# Patient Record
Sex: MALE | Race: WHITE | NOT HISPANIC OR LATINO | Employment: UNEMPLOYED | URBAN - METROPOLITAN AREA
[De-identification: names, ages, dates, MRNs, and addresses within clinical notes are randomized per-mention and may not be internally consistent; named-entity substitution may affect disease eponyms.]

---

## 2017-10-13 ENCOUNTER — HOSPITAL ENCOUNTER (EMERGENCY)
Facility: HOSPITAL | Age: 62
End: 2017-10-14
Attending: EMERGENCY MEDICINE
Payer: MEDICARE

## 2017-10-13 ENCOUNTER — ALLSCRIPTS OFFICE VISIT (OUTPATIENT)
Dept: OTHER | Facility: OTHER | Age: 62
End: 2017-10-13

## 2017-10-13 VITALS
HEART RATE: 80 BPM | TEMPERATURE: 98.3 F | SYSTOLIC BLOOD PRESSURE: 135 MMHG | HEIGHT: 70 IN | RESPIRATION RATE: 18 BRPM | BODY MASS INDEX: 28.2 KG/M2 | DIASTOLIC BLOOD PRESSURE: 90 MMHG | WEIGHT: 197 LBS | OXYGEN SATURATION: 99 %

## 2017-10-13 DIAGNOSIS — F32.A DEPRESSION: Primary | ICD-10-CM

## 2017-10-13 LAB
ALBUMIN SERPL BCP-MCNC: 3.8 G/DL (ref 3.5–5)
ALP SERPL-CCNC: 97 U/L (ref 46–116)
ALT SERPL W P-5'-P-CCNC: 22 U/L (ref 12–78)
AMPHETAMINES SERPL QL SCN: NEGATIVE
ANION GAP SERPL CALCULATED.3IONS-SCNC: 11 MMOL/L (ref 4–13)
AST SERPL W P-5'-P-CCNC: 11 U/L (ref 5–45)
BARBITURATES UR QL: NEGATIVE
BASOPHILS # BLD AUTO: 0 THOUSANDS/ΜL (ref 0–0.1)
BASOPHILS NFR BLD AUTO: 0 % (ref 0–1)
BENZODIAZ UR QL: NEGATIVE
BILIRUB SERPL-MCNC: 0.3 MG/DL (ref 0.2–1)
BILIRUB UR QL STRIP: NEGATIVE
BUN SERPL-MCNC: 13 MG/DL (ref 5–25)
CALCIUM SERPL-MCNC: 9.1 MG/DL (ref 8.3–10.1)
CHLORIDE SERPL-SCNC: 104 MMOL/L (ref 100–108)
CLARITY UR: CLEAR
CO2 SERPL-SCNC: 25 MMOL/L (ref 21–32)
COCAINE UR QL: NEGATIVE
COLOR UR: NORMAL
CREAT SERPL-MCNC: 0.91 MG/DL (ref 0.6–1.3)
EOSINOPHIL # BLD AUTO: 0.2 THOUSAND/ΜL (ref 0–0.61)
EOSINOPHIL NFR BLD AUTO: 2 % (ref 0–6)
ERYTHROCYTE [DISTWIDTH] IN BLOOD BY AUTOMATED COUNT: 14.1 % (ref 11.6–15.1)
ETHANOL SERPL-MCNC: <3 MG/DL (ref 0–3)
GFR SERPL CREATININE-BSD FRML MDRD: 91 ML/MIN/1.73SQ M
GLUCOSE SERPL-MCNC: 142 MG/DL (ref 65–140)
GLUCOSE UR STRIP-MCNC: NEGATIVE MG/DL
HCT VFR BLD AUTO: 51 % (ref 42–52)
HGB BLD-MCNC: 16.8 G/DL (ref 14–18)
HGB UR QL STRIP.AUTO: NEGATIVE
KETONES UR STRIP-MCNC: NEGATIVE MG/DL
LEUKOCYTE ESTERASE UR QL STRIP: NEGATIVE
LYMPHOCYTES # BLD AUTO: 2.2 THOUSANDS/ΜL (ref 0.6–4.47)
LYMPHOCYTES NFR BLD AUTO: 17 % (ref 14–44)
MCH RBC QN AUTO: 29.9 PG (ref 27–31)
MCHC RBC AUTO-ENTMCNC: 32.9 G/DL (ref 31.4–37.4)
MCV RBC AUTO: 91 FL (ref 82–98)
METHADONE UR QL: NEGATIVE
MONOCYTES # BLD AUTO: 1 THOUSAND/ΜL (ref 0.17–1.22)
MONOCYTES NFR BLD AUTO: 7 % (ref 4–12)
NEUTROPHILS # BLD AUTO: 9.7 THOUSANDS/ΜL (ref 1.85–7.62)
NEUTS SEG NFR BLD AUTO: 74 % (ref 43–75)
NITRITE UR QL STRIP: NEGATIVE
NRBC BLD AUTO-RTO: 0 /100 WBCS
OPIATES UR QL SCN: NEGATIVE
PCP UR QL: NEGATIVE
PH UR STRIP.AUTO: 7 [PH] (ref 5–9)
PLATELET # BLD AUTO: 180 THOUSANDS/UL (ref 130–400)
PLATELET BLD QL SMEAR: ADEQUATE
PMV BLD AUTO: 10.3 FL (ref 8.9–12.7)
POTASSIUM SERPL-SCNC: 4.1 MMOL/L (ref 3.5–5.3)
PROT SERPL-MCNC: 7.6 G/DL (ref 6.4–8.2)
PROT UR STRIP-MCNC: NEGATIVE MG/DL
RBC # BLD AUTO: 5.61 MILLION/UL (ref 4.7–6.1)
SODIUM SERPL-SCNC: 140 MMOL/L (ref 136–145)
SP GR UR STRIP.AUTO: 1.01 (ref 1–1.03)
THC UR QL: POSITIVE
TSH SERPL DL<=0.05 MIU/L-ACNC: 0.9 UIU/ML (ref 0.36–3.74)
UROBILINOGEN UR QL STRIP.AUTO: 0.2 E.U./DL
WBC # BLD AUTO: 13.1 THOUSAND/UL (ref 4.8–10.8)

## 2017-10-13 PROCEDURE — 81003 URINALYSIS AUTO W/O SCOPE: CPT | Performed by: EMERGENCY MEDICINE

## 2017-10-13 PROCEDURE — 80320 DRUG SCREEN QUANTALCOHOLS: CPT | Performed by: EMERGENCY MEDICINE

## 2017-10-13 PROCEDURE — 84443 ASSAY THYROID STIM HORMONE: CPT | Performed by: EMERGENCY MEDICINE

## 2017-10-13 PROCEDURE — 85025 COMPLETE CBC W/AUTO DIFF WBC: CPT | Performed by: EMERGENCY MEDICINE

## 2017-10-13 PROCEDURE — 80307 DRUG TEST PRSMV CHEM ANLYZR: CPT | Performed by: EMERGENCY MEDICINE

## 2017-10-13 PROCEDURE — 80053 COMPREHEN METABOLIC PANEL: CPT | Performed by: EMERGENCY MEDICINE

## 2017-10-13 PROCEDURE — 93005 ELECTROCARDIOGRAM TRACING: CPT | Performed by: EMERGENCY MEDICINE

## 2017-10-13 PROCEDURE — 36415 COLL VENOUS BLD VENIPUNCTURE: CPT | Performed by: EMERGENCY MEDICINE

## 2017-10-13 RX ORDER — NICOTINE 21 MG/24HR
1 PATCH, TRANSDERMAL 24 HOURS TRANSDERMAL DAILY
Status: CANCELLED | OUTPATIENT
Start: 2017-10-14

## 2017-10-13 RX ORDER — RISPERIDONE 1 MG/1
2 TABLET, ORALLY DISINTEGRATING ORAL
Status: CANCELLED | OUTPATIENT
Start: 2017-10-13

## 2017-10-13 RX ORDER — HYDROXYZINE HYDROCHLORIDE 25 MG/1
25 TABLET, FILM COATED ORAL EVERY 4 HOURS PRN
Status: CANCELLED | OUTPATIENT
Start: 2017-10-13

## 2017-10-13 RX ORDER — ACETAMINOPHEN 325 MG/1
650 TABLET ORAL EVERY 4 HOURS PRN
Status: CANCELLED | OUTPATIENT
Start: 2017-10-13

## 2017-10-13 RX ORDER — TRAZODONE HYDROCHLORIDE 50 MG/1
50 TABLET ORAL
Status: CANCELLED | OUTPATIENT
Start: 2017-10-13

## 2017-10-13 RX ORDER — OLANZAPINE 10 MG/1
5 INJECTION, POWDER, LYOPHILIZED, FOR SOLUTION INTRAMUSCULAR EVERY 6 HOURS PRN
Status: CANCELLED | OUTPATIENT
Start: 2017-10-13

## 2017-10-13 RX ORDER — HALOPERIDOL 5 MG
5 TABLET ORAL EVERY 6 HOURS PRN
Status: CANCELLED | OUTPATIENT
Start: 2017-10-13

## 2017-10-13 RX ORDER — MAGNESIUM HYDROXIDE/ALUMINUM HYDROXICE/SIMETHICONE 120; 1200; 1200 MG/30ML; MG/30ML; MG/30ML
30 SUSPENSION ORAL EVERY 4 HOURS PRN
Status: CANCELLED | OUTPATIENT
Start: 2017-10-13

## 2017-10-13 RX ORDER — LORAZEPAM 1 MG/1
1 TABLET ORAL EVERY 4 HOURS PRN
Status: CANCELLED | OUTPATIENT
Start: 2017-10-13

## 2017-10-13 RX ORDER — ACETAMINOPHEN 325 MG/1
650 TABLET ORAL EVERY 6 HOURS PRN
Status: CANCELLED | OUTPATIENT
Start: 2017-10-13

## 2017-10-13 NOTE — PROGRESS NOTES
[facility] called - beds available - faxed 12 and face sheet about 18:00 (original 201 placed in envelope on pt's chart)  Faxed EKG to [facility] @ 20:15 - called to verify receipt  21:45 - called [facility] for an update - they had 3 referrals at the same time and crisis worker going to speak to the unit shortly  22:40 - called [facility] for an update - they are still waiting a response from [address] [place] [address]; Mahnaz Rodriguez phone number provided; called Mahnaz Rodriguez / Vick Martinez and updated - chart faxed to Mahnaz Rodriguez

## 2017-10-13 NOTE — ED PROVIDER NOTES
History  Chief Complaint   Patient presents with    Psychiatric Evaluation     Pt here via squad  Wanted to come to the hospital for help because he is having trouble focusing and sts he can't think  Depressed, homeless, recent loss of his mother  Lives in his car with his dog  Adamantly denies suicidal thoughts  61-year-old male presents with depression and inability to function with his daily life as he can't focus  This has been going on for a couple of months  He is currently homeless and is living in his car  Also has some situational problems where his mother  and his 2 ex wives   No suicidal or homicidal ideation  no medical complaints  History provided by:  Patient   used: No        None       Past Medical History:   Diagnosis Date    Cardiac disease     Diabetes mellitus ([address] )     Hyperlipidemia     Hypertension     ICD (implantable cardioverter-defibrillator) in place        Past Surgical History:   Procedure Laterality Date    CORONARY STENT PLACEMENT      HERNIA REPAIR         History reviewed  No pertinent family history  I have reviewed and agree with the history as documented  Social History   Substance Use Topics    Smoking status: Current Every Day Smoker     Packs/day: 1 00    Smokeless tobacco: Never Used    Alcohol use Yes      Comment: rarely        Review of Systems   All other systems reviewed and are negative  Physical Exam  ED Triage Vitals   Temperature Pulse Respirations Blood Pressure SpO2   10/13/17 1653 10/13/17 1653 10/13/17 1653 10/13/17 1653 10/13/17 1653   98 3 °F (36 8 °C) 90 18 (!) 177/102 99 %      Temp src Heart Rate Source Patient Position - Orthostatic VS BP Location FiO2 (%)   -- -- 10/13/17 2157 10/13/17 2157 --     Sitting Right arm       Pain Score       10/13/17 1653       No Pain           Physical Exam   Constitutional: He is oriented to person, place, and time   He appears well-developed and well-nourished  HENT:   Head: Normocephalic and atraumatic  Eyes: EOM are normal  Pupils are equal, round, and reactive to light  Neck: Normal range of motion  Neck supple  Cardiovascular: Normal rate and regular rhythm  Pulmonary/Chest: Effort normal and breath sounds normal    Abdominal: Soft  Bowel sounds are normal    Musculoskeletal: Normal range of motion  Neurological: He is alert and oriented to person, place, and time  Skin: Skin is warm and dry  Psychiatric: He has a normal mood and affect  Nursing note and vitals reviewed  ED Medications  Medications - No data to display    Diagnostic Studies  Labs Reviewed   CBC AND DIFFERENTIAL - Abnormal        Result Value Ref Range Status    WBC 13 10 (*) 4 80 - 10 80 Thousand/uL Final    Neutrophils Absolute 9 70 (*) 1 85 - 7 62 Thousands/µL Final    RBC 5 61  4 70 - 6 10 Million/uL Final    Hemoglobin 16 8  14 0 - 18 0 g/dL Final    Hematocrit 51 0  42 0 - 52 0 % Final    MCV 91  82 - 98 fL Final    MCH 29 9  27 0 - 31 0 pg Final    MCHC 32 9  31 4 - 37 4 g/dL Final    RDW 14 1  11 6 - 15 1 % Final    MPV 10 3  8 9 - 12 7 fL Final    Platelets 218  739 - 400 Thousands/uL Final    nRBC 0  /100 WBCs Final    Neutrophils Relative 74  43 - 75 % Final    Lymphocytes Relative 17  14 - 44 % Final    Monocytes Relative 7  4 - 12 % Final    Eosinophils Relative 2  0 - 6 % Final    Basophils Relative 0  0 - 1 % Final    Lymphocytes Absolute 2 20  0 60 - 4 47 Thousands/µL Final    Monocytes Absolute 1 00  0 17 - 1 22 Thousand/µL Final    Eosinophils Absolute 0 20  0 00 - 0 61 Thousand/µL Final    Basophils Absolute 0 00  0 00 - 0 10 Thousands/µL Final   COMPREHENSIVE METABOLIC PANEL - Abnormal     Glucose 142 (*) 65 - 140 mg/dL Final    Comment:   If the patient is fasting, the ADA then defines impaired fasting glucose as > 100 mg/dL and diabetes as > or equal to 123 mg/dL    Specimen collection should occur prior to Sulfasalazine administration due to the potential for falsely depressed results  Specimen collection should occur prior to Sulfapyridine administration due to the potential for falsely elevated results  Sodium 140  136 - 145 mmol/L Final    Potassium 4 1  3 5 - 5 3 mmol/L Final    Chloride 104  100 - 108 mmol/L Final    CO2 25  21 - 32 mmol/L Final    Anion Gap 11  4 - 13 mmol/L Final    BUN 13  5 - 25 mg/dL Final    Creatinine 0 91  0 60 - 1 30 mg/dL Final    Comment: Standardized to IDMS reference method    Calcium 9 1  8 3 - 10 1 mg/dL Final    AST 11  5 - 45 U/L Final    Comment:   Specimen collection should occur prior to Sulfasalazine administration due to the potential for falsely depressed results  ALT 22  12 - 78 U/L Final    Comment:   Specimen collection should occur prior to Sulfasalazine administration due to the potential for falsely depressed results  Alkaline Phosphatase 97  46 - 116 U/L Final    Total Protein 7 6  6 4 - 8 2 g/dL Final    Albumin 3 8  3 5 - 5 0 g/dL Final    Total Bilirubin 0 30  0 20 - 1 00 mg/dL Final    eGFR 91  ml/min/1 73sq m Final    Narrative:     National Kidney Disease Education Program recommendations are as follows:  GFR calculation is accurate only with a steady state creatinine  Chronic Kidney disease less than 60 ml/min/1 73 sq  meters  Kidney failure less than 15 ml/min/1 73 sq  meters  RAPID DRUG SCREEN, URINE - Abnormal     THC Urine Positive (*) Negative Final    Amph/Meth UR Negative  Negative Final    Barbiturate Ur Negative  Negative Final    Benzodiazepine Urine Negative  Negative Final    Cocaine Urine Negative  Negative Final    Methadone Urine Negative  Negative Final    Opiate Urine Negative  Negative Final    PCP Ur Negative  Negative Final    Narrative:     Presumptive report  If requested, specimen will be sent to reference lab for confirmation  FOR MEDICAL PURPOSES ONLY  IF CONFIRMATION NEEDED PLEASE CONTACT THE LAB WITHIN 5 DAYS      Drug Screen Cutoff Levels:  AMPHETAMINE/METHAMPHETAMINES  1000 ng/mL  BARBITURATES     200 ng/mL  BENZODIAZEPINES     200 ng/mL  COCAINE      300 ng/mL  METHADONE      300 ng/mL  OPIATES      300 ng/mL  PHENCYCLIDINE     25 ng/mL  THC       50 ng/mL   MEDICAL ALCOHOL - Normal    Ethanol Lvl <3  0 - 3 mg/dL Final   URINALYSIS WITH REFLEX TO MICROSCOPIC - Normal    Color, UA Light Yellow   Final    Clarity, UA Clear   Final    Specific [place], UA 1 015  1 000 - 1 030 Final    pH, UA 7 0  5 0 - 9 0 Final    Leukocytes, UA Negative  Negative Final    Nitrite, UA Negative  Negative Final    Protein, UA Negative  Negative mg/dl Final    Glucose, UA Negative  Negative mg/dl Final    Ketones, UA Negative  Negative mg/dl Final    Urobilinogen, UA 0 2  0 2, 1 0 E U /dl E U /dl Final    Bilirubin, UA Negative  Negative Final    Blood, UA Negative  Negative Final   TSH, 3RD GENERATION - Normal    TSH 3RD GENERATON 0 903  0 358 - 3 740 uIU/mL Final    Narrative:     Patients undergoing fluorescein dye angiography may retain small amounts of fluorescein in the body for 48-72 hours post procedure  Samples containing fluorescein can produce falsely depressed TSH values  If the patient had this procedure,a specimen should be resubmitted post fluorescein clearance     SMEAR REVIEW(PHLEBS DO NOT ORDER) - Normal    Platelet Estimate Adequate  Adequate Final       No orders to display       Procedures  ECG 12 Lead Documentation  Performed by: Grazyna Millard by: Jennie Mcdowell     ECG reviewed by me, the ED Provider: yes    Patient location:  ED  Previous ECG:     Previous ECG:  Unavailable    Comparison to cardiac monitor: Yes    Interpretation:     Interpretation: non-specific    Rate:     ECG rate assessment: normal    Rhythm:     Rhythm: sinus rhythm    Ectopy:     Ectopy: none    QRS:     QRS axis:  Normal  Conduction:     Conduction: normal    ST segments:     ST segments:  Non-specific  T waves:     T waves: non-specific              Phone Contacts  ED Phone Contact    ED Course  ED Course                                MDM  Number of Diagnoses or Management Options  Diagnosis management comments: Depression, psychiatric screening, medical screening    Medically cleared, pending psych referral        Amount and/or Complexity of Data Reviewed  Clinical lab tests: ordered and reviewed  Tests in the radiology section of CPT®: ordered and reviewed  Tests in the medicine section of CPT®: ordered and reviewed    Patient Progress  Patient progress: stable    CritCare Time    Disposition  Final diagnoses:   Depression     ED Disposition     ED Disposition Condition Comment    Transfer to Another Bhumiveronica Ashley should be transferred out to [facility]      MD Documentation    [facility] Physician  Dr Ingrid Maravilla Name, [address] by (Company and Unit #)  Gila Double   Sending MD Dr Get Ott Name, [address] [place] Assignment  P-7   Transported by (Company and Unit #)  [facility]      Follow-up Information    None       Patient's Medications    No medications on file     No discharge procedures on file      ED Provider  Electronically Signed by       Sharmin Pretty DO  10/13/17 2106       Tosha Ernandez DO  10/14/17 0023

## 2017-10-13 NOTE — PROGRESS NOTES
64 yo Wid-WM presents to ER via ambulance (police at the scene) - pt is unaware who called 911 or why ([address] [facility] [place] [facility]any reported that communications center notified him that pt was "depressed with +SI")  Stressors: "mother passed away 9/7/17; lost everything twice and  twice; tried living in the [address] [place] [address] Street - surrounded by needles and drug freaks - got locked out of house and when gained entry - took clothes and dog and left; [facility] helping to expedite pt's food stamp application - pt on phone with supervisor today b/c of another delay, but pt had been approved for $15 00 month and pt told supervisor to stick it up her ass; homeless since 9/15/17 and living in his car with his dog"  Mood = "terrible" and mood swings are reported  Sxs include: "I need to talk to someone - I can't talk to people - don't really tell them what is going on"; sleep is 3-4 hours per night; pt "eats when I can" - some weight loss is reported (unknown amount); "unable to concentrate at all" - therefore pt has been unable to go to work - I can't function"; "very little" energy; no social supports - "my son shuts me out"; thinking is not clear with +racing/tangential thoughts; +hopelessness; some anxiety - extra worrying - pt gets "upset" b/c people are constantly telling him he is yelling at them; some paranoia; "I'm screwed up"; etoh use drom age 27 - social use only and last use about 3-4 months ago  Pt denies: all lethality; psychosis; D/A use; anhedonia  Pt is unable to provide collateral contacts b/c phone is in his car

## 2017-10-14 ENCOUNTER — HOSPITAL ENCOUNTER (INPATIENT)
Facility: HOSPITAL | Age: 62
LOS: 4 days | Discharge: HOME | DRG: 885 | End: 2017-10-18
Attending: PSYCHIATRY & NEUROLOGY | Admitting: PSYCHIATRY & NEUROLOGY
Payer: MEDICARE

## 2017-10-14 DIAGNOSIS — F33.2 SEVERE RECURRENT MAJOR DEPRESSION WITHOUT PSYCHOTIC FEATURES (HCC): ICD-10-CM

## 2017-10-14 DIAGNOSIS — F17.200 NICOTINE DEPENDENCE: Primary | ICD-10-CM

## 2017-10-14 LAB
ALBUMIN SERPL BCP-MCNC: 3.3 G/DL (ref 3.5–5)
ALP SERPL-CCNC: 88 U/L (ref 46–116)
ALT SERPL W P-5'-P-CCNC: 21 U/L (ref 12–78)
ANION GAP SERPL CALCULATED.3IONS-SCNC: 7 MMOL/L (ref 4–13)
AST SERPL W P-5'-P-CCNC: 15 U/L (ref 5–45)
BILIRUB SERPL-MCNC: 0.29 MG/DL (ref 0.2–1)
BUN SERPL-MCNC: 11 MG/DL (ref 5–25)
CALCIUM SERPL-MCNC: 8.5 MG/DL (ref 8.3–10.1)
CHLORIDE SERPL-SCNC: 112 MMOL/L (ref 100–108)
CO2 SERPL-SCNC: 22 MMOL/L (ref 21–32)
CREAT SERPL-MCNC: 0.74 MG/DL (ref 0.6–1.3)
GFR SERPL CREATININE-BSD FRML MDRD: 100 ML/MIN/1.73SQ M
GLUCOSE SERPL-MCNC: 121 MG/DL (ref 65–140)
POTASSIUM SERPL-SCNC: 4.3 MMOL/L (ref 3.5–5.3)
PROT SERPL-MCNC: 6.7 G/DL (ref 6.4–8.2)
SODIUM SERPL-SCNC: 141 MMOL/L (ref 136–145)

## 2017-10-14 PROCEDURE — 99284 EMERGENCY DEPT VISIT MOD MDM: CPT

## 2017-10-14 PROCEDURE — 80053 COMPREHEN METABOLIC PANEL: CPT | Performed by: PSYCHIATRY & NEUROLOGY

## 2017-10-14 RX ORDER — HALOPERIDOL 5 MG
5 TABLET ORAL EVERY 6 HOURS PRN
Status: DISCONTINUED | OUTPATIENT
Start: 2017-10-14 | End: 2017-10-18 | Stop reason: HOSPADM

## 2017-10-14 RX ORDER — SERTRALINE HYDROCHLORIDE 25 MG/1
25 TABLET, FILM COATED ORAL DAILY
Status: DISCONTINUED | OUTPATIENT
Start: 2017-10-15 | End: 2017-10-15

## 2017-10-14 RX ORDER — ACETAMINOPHEN 325 MG/1
975 TABLET ORAL EVERY 6 HOURS PRN
Status: DISCONTINUED | OUTPATIENT
Start: 2017-10-14 | End: 2017-10-18 | Stop reason: HOSPADM

## 2017-10-14 RX ORDER — OLANZAPINE 10 MG/1
5 INJECTION, POWDER, LYOPHILIZED, FOR SOLUTION INTRAMUSCULAR EVERY 6 HOURS PRN
Status: DISCONTINUED | OUTPATIENT
Start: 2017-10-14 | End: 2017-10-18 | Stop reason: HOSPADM

## 2017-10-14 RX ORDER — ACETAMINOPHEN 325 MG/1
650 TABLET ORAL EVERY 4 HOURS PRN
Status: DISCONTINUED | OUTPATIENT
Start: 2017-10-14 | End: 2017-10-18 | Stop reason: HOSPADM

## 2017-10-14 RX ORDER — HYDROXYZINE HYDROCHLORIDE 25 MG/1
25 TABLET, FILM COATED ORAL EVERY 4 HOURS PRN
Status: DISCONTINUED | OUTPATIENT
Start: 2017-10-14 | End: 2017-10-18 | Stop reason: HOSPADM

## 2017-10-14 RX ORDER — LORAZEPAM 1 MG/1
1 TABLET ORAL EVERY 4 HOURS PRN
Status: DISCONTINUED | OUTPATIENT
Start: 2017-10-14 | End: 2017-10-18 | Stop reason: HOSPADM

## 2017-10-14 RX ORDER — MAGNESIUM HYDROXIDE/ALUMINUM HYDROXICE/SIMETHICONE 120; 1200; 1200 MG/30ML; MG/30ML; MG/30ML
30 SUSPENSION ORAL EVERY 4 HOURS PRN
Status: DISCONTINUED | OUTPATIENT
Start: 2017-10-14 | End: 2017-10-18 | Stop reason: HOSPADM

## 2017-10-14 RX ORDER — ACETAMINOPHEN 325 MG/1
650 TABLET ORAL EVERY 6 HOURS PRN
Status: DISCONTINUED | OUTPATIENT
Start: 2017-10-14 | End: 2017-10-18 | Stop reason: HOSPADM

## 2017-10-14 RX ORDER — RISPERIDONE 1 MG/1
2 TABLET, ORALLY DISINTEGRATING ORAL
Status: DISCONTINUED | OUTPATIENT
Start: 2017-10-14 | End: 2017-10-14

## 2017-10-14 RX ORDER — NICOTINE 21 MG/24HR
1 PATCH, TRANSDERMAL 24 HOURS TRANSDERMAL DAILY
Status: DISCONTINUED | OUTPATIENT
Start: 2017-10-14 | End: 2017-10-18 | Stop reason: HOSPADM

## 2017-10-14 RX ORDER — TRAZODONE HYDROCHLORIDE 50 MG/1
50 TABLET ORAL
Status: DISCONTINUED | OUTPATIENT
Start: 2017-10-14 | End: 2017-10-18 | Stop reason: HOSPADM

## 2017-10-14 RX ORDER — RISPERIDONE 1 MG/1
1 TABLET, ORALLY DISINTEGRATING ORAL EVERY 8 HOURS PRN
Status: DISCONTINUED | OUTPATIENT
Start: 2017-10-14 | End: 2017-10-18 | Stop reason: HOSPADM

## 2017-10-14 RX ADMIN — NICOTINE 1 PATCH: 21 PATCH, EXTENDED RELEASE TRANSDERMAL at 09:26

## 2017-10-14 NOTE — EMTALA/ACUTE CARE TRANSFER
[facility] EMERGENCY DEPARTMENT  [address] [facility] [address]  Dept: [phone]      EMTALA TRANSFER CONSENT    NAME Bindu Paredes                                         1955                              MRN [id number]    I have been informed of my rights regarding examination, treatment, and transfer   by Dr Marco A Navarro DO    Benefits:      Risks:        Consent for Transfer:  I acknowledge that my medical condition has been evaluated and explained to me by the emergency department physician or other qualified medical person and/or my attending physician, who has recommended that I be transferred to the service of  Accepting Physician: Dr Naga Syed at [address] Name, [address] : [address]   The above potential benefits of such transfer, the potential risks associated with such transfer, and the probable risks of not being transferred have been explained to me, and I fully understand them  The doctor has explained that, in my case, the benefits of transfer outweigh the risks  I agree to be transferred  I authorize the performance of emergency medical procedures and treatments upon me in both transit and upon arrival at the receiving facility  Additionally, I authorize the release of any and all medical records to the receiving facility and request they be transported with me, if possible  I understand that the safest mode of transportation during a medical emergency is an ambulance and that the Hospital advocates the use of this mode of transport  Risks of traveling to the receiving facility by car, including absence of medical control, life sustaining equipment, such as oxygen, and medical personnel has been explained to me and I fully understand them  (INDRA CORRECT BOX BELOW)  [  ]  I consent to the stated transfer and to be transported by ambulance/helicopter    [  ]  I consent to the stated transfer, but refuse transportation by ambulance and accept full responsibility for my transportation by car  I understand the risks of non-ambulance transfers and I exonerate the Hospital and its staff from any deterioration in my condition that results from this refusal     X___________________________________________    DATE  10/13/17  TIME________  Signature of patient or legally responsible individual signing on patient behalf           RELATIONSHIP TO PATIENT_________________________          Provider Certification    NAME Glenn Yanez                                         1955                              MRN [id number]    A medical screening exam was performed on the above named patient  Based on the examination:    Condition Necessitating Transfer There were no encounter diagnoses  Patient Condition:      Reason for Transfer:      Transfer Requirements: [facility]    · Space available and qualified personnel available for treatment as acknowledged by    · Agreed to accept transfer and to provide appropriate medical treatment as acknowledged by       Dr Domi Gleason  · Appropriate medical records of the examination and treatment of the patient are provided at the time of transfer   [address], [address] _______  · Transfer will be performed by qualified personnel from [facility]  and appropriate transfer equipment as required, including the use of necessary and appropriate life support measures      Provider Certification: I have examined the patient and explained the following risks and benefits of being transferred/refusing transfer to the patient/family:         Based on these reasonable risks and benefits to the patient and/or the unborn child(rachel), and based upon the information available at the time of the patients examination, I certify that the medical benefits reasonably to be expected from the provision of appropriate medical treatments at another medical facility outweigh the increasing risks, if any, to the individuals medical condition, and in the case of labor to the unborn child, from effecting the transfer      X____________________________________________ DATE 10/13/17        TIME_______      ORIGINAL - SEND TO MEDICAL RECORDS   COPY - SEND WITH PATIENT DURING TRANSFER

## 2017-10-14 NOTE — H&P
H&P Exam - Robert Cuadra 63 y o  male MRN: [id number]    Unit/Bed#: RW5 184-91 Encounter: [id number]    Assessment:  -Depression/Anxiety  -Memory confusion  -CAD, s/p stent  -DM  -HTN  -dyslipidemia      Plan:  -per behavioral medicine  -recommend medical consultation to manage medications and listed PMH    History of Present Illness   67yo gentleman with PMH as listed brought to ED by police on crisis workers recommendations  He describes worsening depression, poor concentration, mood swings and difficulty with functioning daily  He is admitted on 201  Review of Systems   Constitutional: Positive for appetite change  HENT: Negative  Eyes: Negative  Respiratory: Negative  Cardiovascular: Negative  Endocrine: Negative  Genitourinary: Negative  Musculoskeletal: Negative  Allergic/Immunologic: Negative  Neurological: Negative  Hematological: Negative  Psychiatric/Behavioral: Positive for decreased concentration and sleep disturbance  The patient is nervous/anxious          Historical Information   Past Medical History:   Diagnosis Date    Anxiety     Cardiac disease     Depression     Diabetes mellitus ([address] )     Hyperlipidemia     Hypertension     ICD (implantable cardioverter-defibrillator) in place      Past Surgical History:   Procedure Laterality Date    CORONARY STENT PLACEMENT      HERNIA REPAIR       Social History   History   Alcohol Use    Yes     Comment: rarely     History   Drug Use No     History   Smoking Status    Current Every Day Smoker    Packs/day: 1 00    Years: 15 00   Smokeless Tobacco    Never Used     Family History: non-contributory    Meds/Allergies   all medications and allergies reviewed  No Known Allergies    Objective   First Vitals:   Blood Pressure: 143/93 (10/14/17 0100)  Pulse: 77 (10/14/17 0100)  Temperature: 98 5 °F (36 9 °C) (10/14/17 0100)  Temp Source: Tympanic (10/14/17 0100)  Respirations: 18 (10/14/17 0100)  Height: 5' 10" (177 8 cm) (10/14/17 0100)  Weight - Scale: 85 kg (187 lb 6 3 oz) (10/14/17 0100)  SpO2: 99 % (10/14/17 0100)    Current Vitals:   Blood Pressure: 151/83 (10/14/17 1611)  Pulse: 75 (10/14/17 1611)  Temperature: 98 2 °F (36 8 °C) (10/14/17 1611)  Temp Source: Tympanic (10/14/17 1611)  Respirations: 18 (10/14/17 1611)  Height: 5' 10" (177 8 cm) (10/14/17 0100)  Weight - Scale: 85 kg (187 lb 6 3 oz) (10/14/17 0100)  SpO2: 98 % (10/14/17 1611)    No intake or output data in the 24 hours ending 10/14/17 1758    Invasive Devices          No matching active lines, drains, or airways          Physical Exam   Constitutional: He is oriented to person, place, and time  He appears well-developed and well-nourished  No distress  HENT:   Head: Normocephalic and atraumatic  Mouth/Throat: Oropharynx is clear and moist    Eyes: Conjunctivae and EOM are normal  Pupils are equal, round, and reactive to light  No scleral icterus  Neck: Neck supple  No JVD present  No tracheal deviation present  Cardiovascular: Normal rate, regular rhythm and intact distal pulses  Exam reveals no gallop  No murmur heard  Pulmonary/Chest: Effort normal  No respiratory distress  He has no wheezes  He has no rales  Abdominal: Soft  Bowel sounds are normal  He exhibits no distension  There is no tenderness  Musculoskeletal: Normal range of motion  He exhibits no edema or deformity  Lymphadenopathy:     He has no cervical adenopathy  Neurological: He is alert and oriented to person, place, and time  No cranial nerve deficit  Skin: Skin is warm and dry  Psychiatric: He has a normal mood and affect   Judgment and thought content normal        Lab Results: reviewed  Imaging: NA  EKG, Pathology, and Other Studies: NA    Code Status: Level 1 - Full Code  Advance Directive and Living Will:      Power of :    POLST:      Counseling / Coordination of Care:   None     Talia Pérez PA-C  10/14/2017

## 2017-10-14 NOTE — PROGRESS NOTES
Assessment  1  Memory difficulties (780 93) (R41 3)   2  Depression with anxiety (300 4) (F41 8)    Discussion/Summary    Was advised to go to ER fr crisis evalprn  The treatment plan was reviewed with the patient/guardian  The patient/guardian understands and agrees with the treatment plan      Chief Complaint  pt can't concentrate or think   Having a hard time functioning   He losses train of thought , not being able to finish a sentence  his mother passed in Sept, he cant work  he said he needs help tc/cma      History of Present Illness  HPI: 63 y o m seen for above - states he is under a lot of stress and feels beyond being depressed - keep staying I just need to talk to somebody - stopped taking prior meds, cannot afford them or labs at that time - refused to contact [facility]      Review of Systems  limited 2 to mental complaints  -  very poor historian        Active Problems  1  Encounter for screening for malignant neoplasm of colon (V76 51) (Z12 11)    Current Meds   1  Aspirin 325 MG Oral Tablet Delayed Release; 1 Every Day; Therapy: [id number] to  Requested for: [id number] Recorded    The medication list was reviewed and updated today  Allergies  1  No Known Drug Allergies    Vitals   Recorded: [id number] 01:51PM   Temperature 98 8 F, Temporal   Heart Rate 98, R Radial   Pulse Quality Normal, R Radial   Respiration Quality Normal   Respiration 18   Systolic 037, RUE, Sitting   Diastolic 90, RUE, Sitting   Height 5 ft 10 in   Weight 197 lb    BMI Calculated 28 27   BSA Calculated 2 07     Physical Exam    Constitutional   General appearance: Abnormal   chronically ill,-- obese,-- disheveled clothing-- and-- unkempt appearance  Psychiatric   Mood and affect: Abnormal   Mood and Affect: agitated,-- angry-- and-- anxious          Results/Data  PHQ-9 Adult Depression Screening [id number] 01:58PM User, Fingerprints     Test Name Result Flag Reference   PHQ-9 Adult Depression Score 24     Over the last two weeks, how often have you been bothered by any of the following problems? Little interest or pleasure in doing things: Nearly every day - 3  Feeling down, depressed, or hopeless: Nearly every day - 3  Trouble falling or staying asleep, or sleeping too much: Nearly every day - 3  Feeling tired or having little energy: Nearly every day - 3  Poor appetite or over eating: Nearly every day - 3  Feeling bad about yourself - or that you are a failure or have let yourself or your family down: Nearly every day - 3  Trouble concentrating on things, such as reading the newspaper or watching television: Nearly every day - 3  Moving or speaking so slowly that other people could have noticed   Or the opposite -  being so fidgety or restless that you have been moving around a lot more than usual: Nearly every day - 3  Thoughts that you would be better off dead, or of hurting yourself in some way: Not at all - 0   PHQ-9 Adult Depression Screening Positive     PHQ-9 Difficulty Level Extremely difficult     PHQ-9 Severity Severe Depression         Signatures   Electronically signed by : BRIGIDO Dangelo Si ; Oct 13 2017  2:23PM EST                       (Author)

## 2017-10-14 NOTE — PROGRESS NOTES
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 [employer]  Recently moved from [place], and returned to [place], [place]- where he resides in his car at present  Reports returning to this area to be near family members/and other support people  Reports being confused about his inability to think clearly and needs help getting his life in order  Pt also recently lost his mother  Reports not taking medications for some time, and suffering from diabetes and a past history including 5 heart attacks and multiple cardiac stents  Pt unable to gave names,etc  Of any meds  Pt unable to call family members because his cell phone containing all his contact info  Is locked  in his car  He has a dog who is being cared for by one of his friends  Pt cooperative during interview

## 2017-10-14 NOTE — H&P
Psychiatric Evaluation - Behavioral Health     Identification Data:Arjun Burrows 63 y o  male MRN: [id number]  Unit/Bed#: LK1 338-31 Encounter: [id number]    Chief Complaint:     History of Present Illness     Yashira Palomino is a 59y/o male with h/o depression and anxiety who was brought by police and EMS to the [address] ER at the direction of a crisis worker for worsening depression  He could not remember why he was in the ER but did state that he wanted someone to talk to  He stated he could not concentrate and reported mood swings and inability to function  After medical clearance, Pt was admitted to the [facility] behavioral health unit on a 201 voluntary commitment basis  On the unit he reported he wants to "Get my head back together  I work "  He is depressed (basically reiterating Sxs stated in ER)  He is homeless because he could not pay for his trailer and then left his apartment because he could not afford it anymore and there were nothing but drug users there  He also left a lot of his belongings there but fit as much as he could in his car  He wants a place where he can live with his dog  Cherrie Griffin will not give him an apt where pets are allowed  He has lost many things in life  Two prior wives   He first became very depressed after one wife  in 58  They used to own a zoo together  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 after she  and things went downhill  His mom  2017 which is his son's birthday  His son shuts him out at times  His mom left money in her will but his uncle controls it  Pt feels he would be able to afford a modest rent with the money  All these things had been causing worsening stress, anxiety and depression which came to a head yesterday  He still claims not to He presently denies SI, HI, manic Sxs or psychotic Sxs  He denies any h/o ETOH or illicit drug abuse    However ER urine drug screen was positive for THC  Ethanol was negative  Psychiatric Review Of Systems:  sleep: Insomnia  appetite changes: no  weight changes: no  energy/anergy: reduced  interest/pleasure/anhedonia: reduced   somatic symptoms: no  anxiety/panic: no  olegario: no  guilty/hopeless: yes, sometimes hopelessness  self injurious behavior/risky behavior: no    Historical Information     Past Psychiatric History:   Currently in treatment with: Nobody  He had a psychotherapist but cannot recall the name  He never had a psychiatrist   Last saw a psychiatrist at the [facility] approx 6 years ago  Past Suicide attempts: Pt denies    Past Violent behavior: Pt denies  Past Psychiatric medication trial: Wellbutrin (ineffective)    Substance Abuse History:  Social History     Tobacco History     Smoking Status  Current Every Day Smoker Smoking Frequency  1 pack/day for 15 years (15 pk yrs)    Smokeless Tobacco Use  Never Used          Alcohol History     Alcohol Use Status  Yes Comment  rarely          Drug Use     Drug Use Status  No          Sexual Activity     Sexually Active  No Birth Control/Protection  None          Activities of Daily Living    Pt performs for himself                 Family Psychiatric History:    None per Pt    Social History:  Education: high school diploma/GED  Learning Disabilities: None per Pt  Marital history:  first wife, and  x 2  Has one son 34y/o from first wife  Living arrangement, social support: homeless  Mom was primary support  "My son sometimes"  Occupational History:   Functioning Relationships: Relationship with son is "OK"      Other Pertinent History: No legal or  Hx or ECT      Traumatic History:   Abuse: Pt denies  Other Traumatic Events: Pt denies    Past Medical History:   Diagnosis Date    Anxiety     Cardiac disease     Depression     Diabetes mellitus ([address] )     Hyperlipidemia     Hypertension     ICD (implantable cardioverter-defibrillator) in place        Medical Review Of Systems:  Pertinent items are noted in HPI  Meds/Allergies   all current active meds have been reviewed  No Known Allergies    Objective   Vital signs in last 24 hours:  Temp:  [97 6 °F (36 4 °C)-98 5 °F (36 9 °C)] 98 2 °F (36 8 °C)  HR:  [74-90] 75  Resp:  [18] 18  BP: (135-177)/() 151/83    No intake or output data in the 24 hours ending 10/14/17 1630    Mental Status Evaluation:  Appearance:  Dressed in hospital attire, unshaven, a bit disheveled, fair eye contact   Behavior:  Calm, cooperative but a bit edgy   Speech:  Clear, normal rate, sometimes raises his voice a bit   Mood:  anxious and depressed   Affect:  blunted   Language: naming objects and repeating phrases   Thought Process:  Coherent but can be a bit tangential and circumferential, as well as perseverative on needing to work   Associations: intact associations   Thought Content:  No verbalized delusions   Perceptual Disturbances: He denies hallucinations or paranoia and does not appear to be responding to internal stimuli   Risk Potential: Pt denies SI or HI   Sensorium:  person, place, time/date, situation, day of week, month of year and year   Memory:  short term memory 3/3 word recall but able to do serial 7s x 2   Cognition:  Impaired by mood and anxiety   Consciousness:  alert and awake    Attention: attention span appeared shorter than expected for age   Intellect: within normal limits   Fund of Knowledge: awareness of current events: named the president and  of the [facility]   Insight:  limited   Judgment: limited   Muscle Strength and Tone: Adequate   Gait/Station: normal gait/station   Motor Activity: no abnormal movements       Lab Results: I have personally reviewed pertinent lab results      Admission on 10/14/2017   Component Date Value    Sodium 10/14/2017 141     Potassium 10/14/2017 4 3     Chloride 10/14/2017 112*    CO2 10/14/2017 22     Anion Gap 10/14/2017 7     BUN 10/14/2017 11     Creatinine 10/14/2017 0 74     Glucose 10/14/2017 121     Calcium 10/14/2017 8 5     AST 10/14/2017 15     ALT 10/14/2017 21     Alkaline Phosphatase 10/14/2017 88     Total Protein 10/14/2017 6 7     Albumin 10/14/2017 3 3*    Total Bilirubin 10/14/2017 0 29     eGFR 10/14/2017 100    Admission on 10/13/2017, Discharged on 10/14/2017   Component Date Value    WBC 10/13/2017 13 10*    RBC 10/13/2017 5 61     Hemoglobin 10/13/2017 16 8     Hematocrit 10/13/2017 51 0     MCV 10/13/2017 91     MCH 10/13/2017 29 9     MCHC 10/13/2017 32 9     RDW 10/13/2017 14 1     MPV 10/13/2017 10 3     Platelets 56/32/0233 180     nRBC 10/13/2017 0     Neutrophils Relative 10/13/2017 74     Lymphocytes Relative 10/13/2017 17     Monocytes Relative 10/13/2017 7     Eosinophils Relative 10/13/2017 2     Basophils Relative 10/13/2017 0     Neutrophils Absolute 10/13/2017 9 70*    Lymphocytes Absolute 10/13/2017 2 20     Monocytes Absolute 10/13/2017 1 00     Eosinophils Absolute 10/13/2017 0 20     Basophils Absolute 10/13/2017 0 00     Sodium 10/13/2017 140     Potassium 10/13/2017 4 1     Chloride 10/13/2017 104     CO2 10/13/2017 25     Anion Gap 10/13/2017 11     BUN 10/13/2017 13     Creatinine 10/13/2017 0 91     Glucose 10/13/2017 142*    Calcium 10/13/2017 9 1     AST 10/13/2017 11     ALT 10/13/2017 22     Alkaline Phosphatase 10/13/2017 97     Total Protein 10/13/2017 7 6     Albumin 10/13/2017 3 8     Total Bilirubin 10/13/2017 0 30     eGFR 10/13/2017 91     Ethanol Lvl 10/13/2017 <3     Amph/Meth UR 10/13/2017 Negative     Barbiturate Ur 10/13/2017 Negative     Benzodiazepine Urine 10/13/2017 Negative     Cocaine Urine 10/13/2017 Negative     Methadone Urine 10/13/2017 Negative     Opiate Urine 10/13/2017 Negative     PCP Ur 10/13/2017 Negative     THC Urine 10/13/2017 Positive*    Platelet Estimate 03/17/2855 Adequate     Color, UA 10/13/2017 Light Yellow     Clarity, UA 10/13/2017 Clear     Specific Gravity, UA 10/13/2017 1 015     pH, UA 10/13/2017 7 0     Leukocytes, UA 10/13/2017 Negative     Nitrite, UA 10/13/2017 Negative     Protein, UA 10/13/2017 Negative     Glucose, UA 10/13/2017 Negative     Ketones, UA 10/13/2017 Negative     Urobilinogen, UA 10/13/2017 0 2     Bilirubin, UA 10/13/2017 Negative     Blood, UA 10/13/2017 Negative     TSH 3RD GENERATON 10/13/2017 0 903        Imaging Studies: None   EKG, Pathology, and Other Studies: EKG not yet done    Code Status: Level 1 - Full Code  Advance Directive and Living Will: none  Power of :  None     Patient Strengths/Assets: cooperative, motivated, patient is on a voluntary commitment, patient is willing to work on problems, work skills    Patient Barriers/Limitations: homeless, impaired cognition, lack of financial means, lack of social/family support, lack of stable employment, limited support system, Limited insight and judgement      Assessment/Plan     Active Problems:    Severe recurrent major depression without psychotic features ([facility] [address] )    Plan:   Pt has many life stressors and fits criteria for major depression for which I will start Sertraline 25mg  Pt accepted this treatment  MiniCog was normal   Case mgt to help with housing and financial issues  Get Folate, Vit B 12, TSH, T3, T4, CBC with diff, BMP, EKG    Scheduled Meds:  nicotine 1 patch Transdermal Daily   [START ON 10/15/2017] sertraline 25 mg Oral Daily     Continuous Infusions:   PRN Meds:   acetaminophen    acetaminophen    acetaminophen    aluminum-magnesium hydroxide-simethicone    haloperidol    hydrOXYzine HCL    LORazepam    magnesium hydroxide    OLANZapine    risperiDONE    traZODone    Risks, benefits and possible side effects of Medications:   Risks, benefits, and possible side effects of medications explained to patient and patient verbalizes understanding

## 2017-10-14 NOTE — TREATMENT PLAN
TREATMENT PLAN REVIEW - [address] 63 y o  1955 male MRN: [id number]    Mon Anayeli Room / Bed: [place] [address] [id number] Encounter: [id number]          Admit Date/Time:  10/14/2017  1:18 AM    Treatment Team: Attending Provider: Cuate Paula MD; Registered Nurse: Andrés Nicholson RN; Patient Care Technician: Jani Perez; Patient Care Technician: David Tan; Patient Care Technician: Lucero Maciel    Diagnosis: Active Problems:    Severe recurrent major depression without psychotic features [facility])      Patient Strengths/Assets: cooperative, motivated, patient is on a voluntary commitment, patient is willing to work on problems, work skills    Patient Barriers/Limitations: homeless, impaired cognition, lack of financial means, lack of stable employment, limited support system, Limited insight and jugdement    Short Term Goals: decrease in depressive symptoms, decrease in anxiety symptoms, improvement in insight    Long Term Goals: improvement in anxiety, resolution of depressive symptoms, improved insight, stable living arrangements upon discharge, establishment of family support upon discharge    Progress Towards Goals: No progress at this moment    Recommended Treatment: medication management, patient medication education, group therapy, milieu therapy, continued Behavioral Health psychiatric evaluation/assessment process    Treatment Frequency: daily medication monitoring, group and milieu therapy daily, monitoring through interdisciplinary rounds, monitoring through weekly patient care conferences    Expected Discharge Date:    To be determined    Discharge Plan: referral for outpatient medication management with a psychiatrist, referral for outpatient psychotherapy, stable living arrangement    Treatment Plan Created/Updated By: Kayy Long PA-C

## 2017-10-15 LAB
ANION GAP SERPL CALCULATED.3IONS-SCNC: 8 MMOL/L (ref 4–13)
BASOPHILS # BLD AUTO: 0.08 THOUSANDS/ΜL (ref 0–0.1)
BASOPHILS NFR BLD AUTO: 1 % (ref 0–1)
BUN SERPL-MCNC: 12 MG/DL (ref 5–25)
CALCIUM SERPL-MCNC: 8.8 MG/DL (ref 8.3–10.1)
CHLORIDE SERPL-SCNC: 107 MMOL/L (ref 100–108)
CO2 SERPL-SCNC: 25 MMOL/L (ref 21–32)
CREAT SERPL-MCNC: 0.78 MG/DL (ref 0.6–1.3)
EOSINOPHIL # BLD AUTO: 0.38 THOUSAND/ΜL (ref 0–0.61)
EOSINOPHIL NFR BLD AUTO: 4 % (ref 0–6)
ERYTHROCYTE [DISTWIDTH] IN BLOOD BY AUTOMATED COUNT: 14.2 % (ref 11.6–15.1)
FOLATE SERPL-MCNC: 4.3 NG/ML (ref 3.1–17.5)
GFR SERPL CREATININE-BSD FRML MDRD: 98 ML/MIN/1.73SQ M
GLUCOSE SERPL-MCNC: 120 MG/DL (ref 65–140)
HCT VFR BLD AUTO: 50.5 % (ref 36.5–49.3)
HGB BLD-MCNC: 16.8 G/DL (ref 12–17)
LYMPHOCYTES # BLD AUTO: 2.65 THOUSANDS/ΜL (ref 0.6–4.47)
LYMPHOCYTES NFR BLD AUTO: 26 % (ref 14–44)
MCH RBC QN AUTO: 30.3 PG (ref 26.8–34.3)
MCHC RBC AUTO-ENTMCNC: 33.3 G/DL (ref 31.4–37.4)
MCV RBC AUTO: 91 FL (ref 82–98)
MONOCYTES # BLD AUTO: 0.99 THOUSAND/ΜL (ref 0.17–1.22)
MONOCYTES NFR BLD AUTO: 10 % (ref 4–12)
NEUTROPHILS # BLD AUTO: 6.28 THOUSANDS/ΜL (ref 1.85–7.62)
NEUTS SEG NFR BLD AUTO: 59 % (ref 43–75)
NRBC BLD AUTO-RTO: 0 /100 WBCS
PLATELET # BLD AUTO: 179 THOUSANDS/UL (ref 149–390)
PMV BLD AUTO: 12.2 FL (ref 8.9–12.7)
POTASSIUM SERPL-SCNC: 4.6 MMOL/L (ref 3.5–5.3)
RBC # BLD AUTO: 5.54 MILLION/UL (ref 3.88–5.62)
SODIUM SERPL-SCNC: 140 MMOL/L (ref 136–145)
T3 SERPL-MCNC: 0.9 NG/ML (ref 0.6–1.8)
T4 SERPL-MCNC: 9 UG/DL (ref 4.7–13.3)
TSH SERPL DL<=0.05 MIU/L-ACNC: 0.85 UIU/ML (ref 0.36–3.74)
VIT B12 SERPL-MCNC: 374 PG/ML (ref 100–900)
WBC # BLD AUTO: 10.41 THOUSAND/UL (ref 4.31–10.16)

## 2017-10-15 PROCEDURE — 82746 ASSAY OF FOLIC ACID SERUM: CPT | Performed by: PSYCHIATRY & NEUROLOGY

## 2017-10-15 PROCEDURE — 82607 VITAMIN B-12: CPT | Performed by: PSYCHIATRY & NEUROLOGY

## 2017-10-15 PROCEDURE — 84436 ASSAY OF TOTAL THYROXINE: CPT | Performed by: PSYCHIATRY & NEUROLOGY

## 2017-10-15 PROCEDURE — 85025 COMPLETE CBC W/AUTO DIFF WBC: CPT | Performed by: PSYCHIATRY & NEUROLOGY

## 2017-10-15 PROCEDURE — 84443 ASSAY THYROID STIM HORMONE: CPT | Performed by: PSYCHIATRY & NEUROLOGY

## 2017-10-15 PROCEDURE — 84480 ASSAY TRIIODOTHYRONINE (T3): CPT | Performed by: PSYCHIATRY & NEUROLOGY

## 2017-10-15 PROCEDURE — 80048 BASIC METABOLIC PNL TOTAL CA: CPT | Performed by: PHYSICIAN ASSISTANT

## 2017-10-15 RX ADMIN — SERTRALINE HYDROCHLORIDE 25 MG: 25 TABLET ORAL at 08:53

## 2017-10-15 RX ADMIN — NICOTINE 1 PATCH: 21 PATCH, EXTENDED RELEASE TRANSDERMAL at 08:55

## 2017-10-15 NOTE — PROGRESS NOTES
Pt visible in the am, calm and pleasant upon approach  Remains scant in conversation and reports depression and anxiety- but would not elaborate  Denies SI/HI and was medication compliant  Will continue to monitor

## 2017-10-15 NOTE — PLAN OF CARE
Problem: DEPRESSION  Goal: Will be euthymic at discharge  INTERVENTIONS:  - Administer medication as ordered  - Provide emotional support via 1:1 interaction with staff  - Encourage involvement in milieu/groups/activities  - Monitor for social isolation   Outcome: Progressing      Problem: ANXIETY  Goal: Will report anxiety at manageable levels  INTERVENTIONS:  - Administer medication as ordered  - Teach and encourage coping skills  - Provide emotional support  - Assess patient/family for anxiety and ability to cope   Outcome: Progressing    Goal: By discharge: Patient will verbalize 2 strategies to deal with anxiety  Interventions:  - Identify any obvious source/trigger to anxiety  - Staff will assist patient in applying identified coping technique/skills  - Encourage attendance of scheduled groups and activities   Outcome: Progressing      Problem: SUBSTANCE USE/ABUSE  Goal: Will have no detox symptoms and will verbalize plan for changing substance-related behavior  INTERVENTIONS:  - Monitor physical status and assess for symptoms of withdrawal  - Administer medication as ordered  - Provide emotional support with 1 on 1 interaction with staff  - Encourage recovery focused program/ addiction education  - Assess for verbalization of changing behaviors related to substance abuse  - Initiate consults and referrals as appropriate (Case Management, Spiritual Care, etc )   Outcome: Progressing    Goal: By discharge, will develop insight into their chemical dependency and sustain motivation to continue in recovery  INTERVENTIONS:  - Attends all daily group sessions and scheduled AA groups  - Actively practices coping skills through participation in the therapeutic community and adherence to program rules  - Reviews and completes assignments from individual treatment plan  - Assist patient development of understanding of their personal cycle of addiction and relapse triggers   Outcome: Progressing    Goal: By discharge, patient will have ongoing treatment plan addressing chemical dependency  INTERVENTIONS:  - Assist patient with resources and/or appointments for ongoing recovery based living   Outcome: Progressing

## 2017-10-15 NOTE — PROGRESS NOTES
Progress Note - Behavioral Health   Jeanna Reece 63 y o  male MRN: [id number]  Unit/Bed#: OG3 456-34 Encounter: [id number]    Yolanda Wetzel was seen for continuing care and reviewed with treatment team   Pt reports feeling "Alright  I'm worried about my dog "  He is depressed and anxious but presently denies SI, HI or psychotic Sxs  Floor team relays he was visible in milieu but selectively social   Otherwise scant but calm and cooperative  Sleep: Good  Appetite: Good  Medication side effects: None per Pt  ROS: No complaints    Labs/Tests:  Reviewed  Today's WBC 10 41 (improving)    Mental Status Evaluation:   Appearance:  Dressed, a bit disheveled, fair eye contact   Behavior:  Calm, cooperative, less edgy   Speech:  Clear, normal rate and volume   Mood:  Anxious, Depressed   Affect:  Blunted   Thought Process:  Organized   Associations: intact associations   Thought Content:  No verbalized delusions   Perceptual Disturbances: Pt denies any hallucinations or paranoia and does not appear to be responding to internal stimuli   Risk Potential: Pt presently denies SI or HI    Sensorium:  Day of the week, Month, Year, Self, birthday, that he is in a hospital   Memory:  short term memory grossly intact   Consciousness:  alert, awake   Attention: Good   Insight:  Limited   Judgment: Limited   Gait/Station: Normal gait/station   Motor Activity: No abnormal movements     Vitals:    10/15/17 0751   BP: 128/83   Pulse: 77   Resp: 17   Temp: 98 1 °F (36 7 °C)   SpO2: 98%       Progress Toward Goals:   No significant change  Titrate Sertraline to 50mg starting tomorrow morning  Assessment/Plan   Active Problems:    Severe recurrent major depression without psychotic features ([facility] [address] )      Recommended Treatment: Continue with pharmacotherapy, group therapy, milieu therapy and occupational therapy    The patient will be maintained on the following medications:    nicotine 1 patch Transdermal Daily   [START ON 10/16/2017] sertraline 50 mg Oral Daily       Risks, benefits and possible side effects of Medications:    Risks, benefits, and possible side effects of medications explained to patient and patient verbalizes understanding

## 2017-10-15 NOTE — PROGRESS NOTES
Pt scant in conversation with staff  Appears to be adjusting to unit routine  Attended group tonight and watched movie with other peers  Denies SI/HI, but admits to, and appears depressed  Still c/o lack of focus  Continue to monitor pt's progress

## 2017-10-16 RX ADMIN — NICOTINE 1 PATCH: 21 PATCH, EXTENDED RELEASE TRANSDERMAL at 10:17

## 2017-10-16 RX ADMIN — SERTRALINE HYDROCHLORIDE 50 MG: 50 TABLET ORAL at 10:17

## 2017-10-16 NOTE — PLAN OF CARE
Problem: DEPRESSION  Goal: Will be euthymic at discharge  INTERVENTIONS:  - Administer medication as ordered  - Provide emotional support via 1:1 interaction with staff  - Encourage involvement in milieu/groups/activities  - Monitor for social isolation   Outcome: Progressing      Problem: ANXIETY  Goal: Will report anxiety at manageable levels  INTERVENTIONS:  - Administer medication as ordered  - Teach and encourage coping skills  - Provide emotional support  - Assess patient/family for anxiety and ability to cope   Outcome: Progressing    Goal: By discharge: Patient will verbalize 2 strategies to deal with anxiety  Interventions:  - Identify any obvious source/trigger to anxiety  - Staff will assist patient in applying identified coping technique/skills  - Encourage attendance of scheduled groups and activities   Outcome: Progressing      Problem: SUBSTANCE USE/ABUSE  Goal: Will have no detox symptoms and will verbalize plan for changing substance-related behavior  INTERVENTIONS:  - Monitor physical status and assess for symptoms of withdrawal  - Administer medication as ordered  - Provide emotional support with 1 on 1 interaction with staff  - Encourage recovery focused program/ addiction education  - Assess for verbalization of changing behaviors related to substance abuse  - Initiate consults and referrals as appropriate (Case Management, Spiritual Care, etc )   Outcome: Progressing    Goal: By discharge, will develop insight into their chemical dependency and sustain motivation to continue in recovery  INTERVENTIONS:  - Attends all daily group sessions and scheduled AA groups  - Actively practices coping skills through participation in the therapeutic community and adherence to program rules  - Reviews and completes assignments from individual treatment plan  - Assist patient development of understanding of their personal cycle of addiction and relapse triggers   Outcome: Progressing    Goal: By discharge, patient will have ongoing treatment plan addressing chemical dependency  INTERVENTIONS:  - Assist patient with resources and/or appointments for ongoing recovery based living   Outcome: Progressing      Problem: Ineffective Coping  Goal: Participates in unit activities  Interventions:  - Provide therapeutic environment   - Provide required programming   - Redirect inappropriate behaviors    Outcome: Progressing

## 2017-10-16 NOTE — PROGRESS NOTES
Progress Note - Behavioral Health   Whit Markos 63 y o  male MRN: [id number]  Unit/Bed#: ZK3 563-01 Encounter: [id number]    Assessment/Plan   Principal Problem:    Severe recurrent major depression without psychotic features ([address] )      Behavior over the last 24 hours:  unchanged  Sleep: normal  Appetite: normal  Medication side effects: No  ROS: "i need to get back to [address] to my car and my cell phone to get in touch with my family, no one knows where I am"    Mental Status Evaluation:  Appearance:  age appropriate and disheveled   Behavior:  cooperative   Speech:  loud   Mood:  irritable   Affect:  mood-congruent   Thought Process:  goal directed and logical   Thought Content:  normal   Perceptual Disturbances: None   Risk Potential: Suicidal Ideations none, Homicidal Ideations none and Potential for Aggression No   Sensorium:  person, place and time/date   Cognition:  grossly intact   Consciousness:  alert and awake    Attention: attention span and concentration were age appropriate   Insight:  limited   Judgment: limited   Gait/Station: normal gait/station and normal balance   Motor Activity: no abnormal movements     Progress Toward Goals: Patient stated he got "screwed up" when police showed up to his car and put him in an ambulance to come here  He stated he knew he was having a mental breakdown because he just lost his mother, lost his apartment and all his belongings are in a storage unit  He stated his income is SSI and he also had a part time job assembling  He had moved to [place] after his mother's passing to start a new job but he didn't like the city and the drug addicts in the street and he left the apartment and went back home([place])  He wanted to go in a hospital because he knew he needed help and he had contacted his friend Laureldilma Kaur to take care of his dog but before he knew he was in a ambulance and Brent Kaur doesn't even knew where he was heading  He stated he is not homeless   All he needs is to get in touch with his uncle to get money to get a place, also he stated he has money to rent a motel room  Also reported Sertraline has been helpful and denies medication side effects  Recommended Treatment: Continue with group therapy, milieu therapy and occupational therapy  Risks, benefits and possible side effects of Medications:   Risks, benefits, and possible side effects of medications explained to patient and patient verbalizes understanding  Medications:   all current active meds have been reviewed, continue current psychiatric medications and current meds:   Current Facility-Administered Medications   Medication Dose Route Frequency    acetaminophen (TYLENOL) tablet 650 mg  650 mg Oral Q6H PRN    acetaminophen (TYLENOL) tablet 650 mg  650 mg Oral Q4H PRN    acetaminophen (TYLENOL) tablet 975 mg  975 mg Oral Q6H PRN    aluminum-magnesium hydroxide-simethicone (MYLANTA) 200-200-20 mg/5 mL oral suspension 30 mL  30 mL Oral Q4H PRN    haloperidol (HALDOL) tablet 5 mg  5 mg Oral Q6H PRN    hydrOXYzine HCL (ATARAX) tablet 25 mg  25 mg Oral Q4H PRN    LORazepam (ATIVAN) tablet 1 mg  1 mg Oral Q4H PRN    magnesium hydroxide (MILK OF MAGNESIA) 400 mg/5 mL oral suspension 30 mL  30 mL Oral Daily PRN    nicotine (NICODERM CQ) 21 mg/24 hr TD 24 hr patch 1 patch  1 patch Transdermal Daily    OLANZapine (ZyPREXA) IM injection 5 mg  5 mg Intramuscular Q6H PRN    risperiDONE (RisperDAL M-TABS) dispersible tablet 1 mg  1 mg Oral Q8H PRN    sertraline (ZOLOFT) tablet 50 mg  50 mg Oral Daily    traZODone (DESYREL) tablet 50 mg  50 mg Oral HS PRN     Labs: reviewed     Counseling / Coordination of Care  Total floor / unit time spent today n/a minutes  Greater than 50% of total time was spent with the patient and / or family counseling and / or coordination of care   A description of the counseling / coordination of care:

## 2017-10-16 NOTE — PROGRESS NOTES
Pt brighter tonight  Was social with select peers  Disscussed wanting to meet with doctor tomorrow to start discharge plans  Pt says he is not too concerned about his current living situation, but is more interested in a quick return to work  Reports feeling less depressed and feels Zoloft Is helping him  Pt much more animated when speaking, and his thinking seems much more organized tonight

## 2017-10-16 NOTE — CASE MANAGEMENT
Pt is a 63 y o white M 201 from [place] [address] ED  Admitted for increase in depression, anxiety and bizarre behaviors  He noes that he has no previous psychiatric hospitalizations  [facility] and said [facility] help, 911 was called and he was brought to the ER  Pt is  x 2, mother  on 17 and he has been homeless living in his car with his dog since 9/15/17  He denies any active SI/HI  He reports no previous psych history and that he would like to leave here soon as he has a job through [employer] "  He signed VAHE's for his PCP, son & [employer]  He does not have any active numbers for his family members, as his cell phone is in his car that is at the [address] address  He will need [employer] transport at that time  His dog is being care for by a friend, but patient is distressed about not being his with dog  Pt reports having Medicare A/B  [facility] contacted and they have openings  Intake called and left message: patient's demographics faxed over along with mediations  They have 48 hours to contact back with appt times/dates

## 2017-10-16 NOTE — PROGRESS NOTES
Pt visible in the milieu, calm/pleasant upon approach and remains medication compliant  Pt is scant in conversation and denies all symptoms, but appears depressed and flat affect  Will continue to monitor

## 2017-10-17 RX ADMIN — SERTRALINE HYDROCHLORIDE 50 MG: 50 TABLET ORAL at 08:43

## 2017-10-17 RX ADMIN — NICOTINE 1 PATCH: 21 PATCH, EXTENDED RELEASE TRANSDERMAL at 08:44

## 2017-10-17 NOTE — PROGRESS NOTES
Pt remains quiet/scant but visible among peers in the miliue  Denies SI and offers no complaints at this time  Noted to be more verbal during group tonight   Will monitor

## 2017-10-17 NOTE — PROGRESS NOTES
Pt calm, pleasant, cooperative, and continues to be med compliant  Pt out in the milieu most of day  Pt denies s/s at current and reports 'I only came in here because I was depressed and wanted someone to talk to  Now I am ready to go  None of my family knows I am here because I do not have any of their numbers and so I need to get out of here ' Pt kept reiterating this  Reassured pt case workers were working on getting him set up for discharge and trying to find a way to get in touch with his family  Will continue to monitor

## 2017-10-17 NOTE — CASE MANAGEMENT
Nursing last evening were able to have patient's wallet brought up and copies of his Medicare Card, Social Security card, 's license and his defibrillator card  I, too, made copies and played copies in his EMR for future reference

## 2017-10-17 NOTE — PROGRESS NOTES
Progress Note - Behavioral Health   ju Guzmán 63 y o  male MRN: [id number]  Unit/Bed#: UP2 563-01 Encounter: [id number]    Assessment/Plan   Principal Problem:    Severe recurrent major depression without psychotic features ([address] )      Behavior over the last 24 hours:  unchanged  Sleep: normal  Appetite: normal  Medication side effects: No  ROS:   Mental Status Evaluation:  Appearance:  age appropriate and casually dressed   Behavior:  guarded   Speech:  normal pitch and normal volume   Mood:  depressed   Affect:  labile   Thought Process:  Goal oriented, logical   Thought Content:  No delusions   Perceptual Disturbances: none   Risk Potential: Suicidal Ideations none, Homicidal Ideations none and Potential for Aggression No   Sensorium:  person and place,time   Cognition:  intact   Consciousness:  alert and awake    Attention: attention span and concentration were age appropriate   Insight:  limited   Judgment: limited   Gait/Station: normal gait/station and normal balance   Motor Activity: no abnormal movements     Progress Toward Goals:   Patient stated that Sertraline has been helpful and denies medication side effects  He agrees to continue treatment and to keep follow up appointments upon discharge  He insists he rather be discharged so he can deal with his housing situation  Recommended Treatment: Continue with group therapy, milieu therapy and occupational therapy        Risks, benefits and possible side effects of Medications:     Medications:   all current active meds have been reviewed, continue current psychiatric medications and current meds:   Current Facility-Administered Medications   Medication Dose Route Frequency    acetaminophen (TYLENOL) tablet 650 mg  650 mg Oral Q6H PRN    acetaminophen (TYLENOL) tablet 650 mg  650 mg Oral Q4H PRN    acetaminophen (TYLENOL) tablet 975 mg  975 mg Oral Q6H PRN    aluminum-magnesium hydroxide-simethicone (MYLANTA) 200-200-20 mg/5 mL oral suspension 30 mL  30 mL Oral Q4H PRN    haloperidol (HALDOL) tablet 5 mg  5 mg Oral Q6H PRN    hydrOXYzine HCL (ATARAX) tablet 25 mg  25 mg Oral Q4H PRN    LORazepam (ATIVAN) tablet 1 mg  1 mg Oral Q4H PRN    magnesium hydroxide (MILK OF MAGNESIA) 400 mg/5 mL oral suspension 30 mL  30 mL Oral Daily PRN    nicotine (NICODERM CQ) 21 mg/24 hr TD 24 hr patch 1 patch  1 patch Transdermal Daily    OLANZapine (ZyPREXA) IM injection 5 mg  5 mg Intramuscular Q6H PRN    risperiDONE (RisperDAL M-TABS) dispersible tablet 1 mg  1 mg Oral Q8H PRN    sertraline (ZOLOFT) tablet 50 mg  50 mg Oral Daily    traZODone (DESYREL) tablet 50 mg  50 mg Oral HS PRN     Labs: reviewed    Counseling / Coordination of Care  Total floor / unit time spent today n/a minutes  Greater than 50% of total time was spent with the patient and / or family counseling and / or coordination of care   A description of the counseling / coordination of care:

## 2017-10-18 VITALS
DIASTOLIC BLOOD PRESSURE: 90 MMHG | WEIGHT: 187.39 LBS | RESPIRATION RATE: 18 BRPM | TEMPERATURE: 98 F | OXYGEN SATURATION: 98 % | BODY MASS INDEX: 26.83 KG/M2 | HEART RATE: 80 BPM | SYSTOLIC BLOOD PRESSURE: 151 MMHG | HEIGHT: 70 IN

## 2017-10-18 LAB
ATRIAL RATE: 76 BPM
P AXIS: 61 DEGREES
PR INTERVAL: 140 MS
QRS AXIS: 99 DEGREES
QRSD INTERVAL: 104 MS
QT INTERVAL: 398 MS
QTC INTERVAL: 447 MS
T WAVE AXIS: 66 DEGREES
VENTRICULAR RATE: 76 BPM

## 2017-10-18 RX ADMIN — SERTRALINE HYDROCHLORIDE 50 MG: 50 TABLET ORAL at 08:26

## 2017-10-18 RX ADMIN — NICOTINE 1 PATCH: 21 PATCH, EXTENDED RELEASE TRANSDERMAL at 08:28

## 2017-10-18 NOTE — PLAN OF CARE
Problem: Ineffective Coping  Goal: Participates in unit activities  Interventions:  - Provide therapeutic environment   - Provide required programming   - Redirect inappropriate behaviors    Outcome: Adequate for Discharge

## 2017-10-18 NOTE — CASE MANAGEMENT
I reached out to [facility] again as they were concerned patient was homeless, pt notes that he will be staying with his friend Yamile Dan [address], she is who has his dog  Vm left for [facility] to schedule intake appt  A/w call back  If patient is to leave today, he would need to find own ride, as [facility] needs 24 hour notice and no notice was given  Will d/w treatment team     He notes that he still doesn't have anyone's phone number  We offered to call the police to go into his car and have them retrieve the phone  Patient was adamantly against this and noted that he does not want police in or near his property

## 2017-10-18 NOTE — DISCHARGE SUMMARY
Discharge Summary - Lanny 63 y o  male MRN: [id number]  Unit/Bed#: NR5 078-11 Encounter: [id number]     Admission Date: 10/14/2017         Discharge Date: No discharge date for patient encounter  Attending Psychiatrist: Guevara Murillo MD    Reason for Admission/HPI:   History of Present Illness     Patient is a 63 y o  male presents with Signs of suicidal potential   Patient was admitted to psychiatric unit on a voluntarily 201 commitment basis  Primary complaints include: depression worse, feeling depressed, increased irritability, poor concentration and relationship difficulties  Onset of symptoms was gradual starting several weeks ago with gradually worsening course since that time  Psychosocial Stressors: family, financial and health  Hospital Course:   Patient was admitted on voluntary basis due to increased depression and anxiety  He had moved to [place] after his mother  and he took a job there but he left the city because there are too many addicts around  He came back home [place] and he stated he had an emotional breakdown and was asking people for help  He was started on Sertraline and he tolerated it well  He is willing to follow up as outpatient and requested discharge because he needed to get to his car in [facility] to get in contact with his uncle to get money to rent a place         Mental Status at time of Discharge:     Appearance:  age appropriate and casually dressed   Behavior:  guarded   Speech:  loud   Mood:  labile   Affect:  labile   Thought Process:  goal directed and logical   Thought Content:  normal   Perceptual Disturbances: None   Risk Potential: Suicidal Ideations none, Homicidal Ideations none and Potential for Aggression No   Sensorium:  person, place and time/date   Cognition:  grossly intact   Consciousness:  alert and awake    Attention: attention span and concentration were age appropriate   Insight:  limited   Judgment: limited Gait/Station: normal gait/station and normal balance   Motor Activity: no abnormal movements       Discharge Diagnosis:   Major depressive Disorder      Discharge Medications:  See after visit summary for reconciled discharge medications provided to patient and family  Discharge instructions/Information to patient and family:   See after visit summary for information provided to patient and family  Provisions for Follow-Up Care:  See after visit summary for information related to follow-up care and any pertinent home health orders  Discharge Statement   I spent 30 minutes discharging the patient  This time was spent on the day of discharge  I had direct contact with the patient on the day of discharge  Additional documentation is required if more than 30 minutes were spent on discharge

## 2017-10-18 NOTE — DISCHARGE INSTR - APPOINTMENTS
Tuesday October 31st @ 945 Intake appt @ [address] [place] [address] (main office)  [address] [place] [address], [facility] [address]  Tel: [phone]  Fax: [phone]

## 2017-10-18 NOTE — NURSING NOTE
Pt left unit accompanied by PCA in stable condition  Discharge information reviewed with patient  Sent home with [employer]  Security to meet pt in main lobby for additional belonging  Pt to be driven home via taxi service

## 2017-10-18 NOTE — PROGRESS NOTES
Pt cooperative with care and looking forward to discharge tomorrow  States he wants to get a hotel room and be with his dog  Denies sx  Monitoring

## 2017-10-18 NOTE — PROGRESS NOTES
Progress Note - Behavioral Health     Yanet Andrews 63 y o  male MRN: [id number]  Unit/Bed#: KX1 869-91 Encounter: [id number]    The patient was seen for continuing care and reviewed with treatment team  Patient dismissive and aloof during interview, providing only one word answers to questions  However, he does deny depression and anxiety  He states he never had any suicidal thoughts, but just wanted to come here to get his psychiatric medications  He is eating and sleeping well  Looking forward to discharge tomorrow  Psychiatric Review of Systems:  Behavior over the last 24 hours:  unchanged  Sleep: normal  Appetite: normal  Medication side effects: No  ROS: no complaints    Mental Status Evaluation:  Appearance:  age appropriate, casually dressed   Behavior:  calm, dismissive    Speech:  scant, soft, one word answers   Mood:  dysphoric   Affect:  blunted, constricted   Thought Process:  organized, logical, goal directed   Associations: intact associations   Thought Content:  normal   Perceptual Disturbances: none   Risk Potential: Suicidal ideation - None  Homicidal ideation - None  Potential for aggression - No   Sensorium:  oriented to person, place and time/date   Memory:  short term memory grossly intact, long term memory grossly intact   Consciousness:  alert and awake   Attention: attention span and concentration are age appropriate   Insight:  limited   Judgment: limited   Gait/Station: normal gait/station and normal balance   Motor Activity: no abnormal movements     Progress Toward Goals: Improved       Assessment/Plan    Principal Problem:    Severe recurrent major depression without psychotic features ([facility] [address] )    Recommended Treatment:   1) continue with current medications   2) discharge tomorrow     nicotine 1 patch Transdermal Daily   sertraline 50 mg Oral Daily       Vitals:    10/18/17 0704   BP: (!) 172/72   Pulse: 77   Resp: 16   Temp: 97 7 °F (36 5 °C)   SpO2: 98%       Risks, benefits and possible side effects of Medications:   Risks, benefits, and possible side effects of medications explained to patient and patient verbalizes understanding        Peter Barros PA-C

## 2017-10-18 NOTE — CASE MANAGEMENT
Shobha Mcmahon is unable to provide transportation for patient this week  As patient is requesting to leave today, taxi voucher has been completed and patient will be leaving shortly  IMM letter signed  Patient explained DC instructions & offered to complaints

## 2017-10-18 NOTE — PROGRESS NOTES
Pt visible in small tv room  Went to groups  Med compliant  States looking forward to discharge to go home to his dog   Denies s/s

## 2018-01-14 VITALS
SYSTOLIC BLOOD PRESSURE: 154 MMHG | WEIGHT: 197 LBS | DIASTOLIC BLOOD PRESSURE: 90 MMHG | HEART RATE: 98 BPM | BODY MASS INDEX: 28.2 KG/M2 | HEIGHT: 70 IN | TEMPERATURE: 98.8 F | RESPIRATION RATE: 18 BRPM

## 2019-06-29 NOTE — CMS CERTIFICATION NOTE
Certification: Based upon physical, mental and social evaluations, I certify that inpatient psychiatric services are medically necessary for this patient for a duration of 5 midnights for the treatment of Major Depressive Disorder severe and recurrent  Available alternative community resources do not meet the patient's mental health care needs  I further attest that an established written individualized plan of care has been implemented and is outlined in the patient's medical records 
Regular rate and rhythm, Heart sounds S1 S2 present, no murmurs, rubs or gallops